# Patient Record
Sex: FEMALE | URBAN - METROPOLITAN AREA
[De-identification: names, ages, dates, MRNs, and addresses within clinical notes are randomized per-mention and may not be internally consistent; named-entity substitution may affect disease eponyms.]

---

## 2019-05-14 ENCOUNTER — OTHER (OUTPATIENT)
Dept: URBAN - METROPOLITAN AREA CLINIC 19 | Facility: CLINIC | Age: 44
Setting detail: DERMATOLOGY
End: 2019-05-14

## 2019-05-14 ENCOUNTER — RX ONLY (RX ONLY)
Age: 44
End: 2019-05-14

## 2019-05-14 PROBLEM — R21 RASH AND OTHER NONSPECIFIC SKIN ERUPTION: Status: RESOLVED | Noted: 2019-05-14

## 2019-05-14 PROCEDURE — 99202 OFFICE O/P NEW SF 15 MIN: CPT

## 2019-05-14 RX ORDER — CLOBETASOL PROPIONATE 0.5 MG/G
1 APPLICATION CREAM TOPICAL AS DIRECTED
Qty: 60 | Refills: 0
Start: 2019-05-14

## 2025-03-10 ENCOUNTER — APPOINTMENT (OUTPATIENT)
Dept: URBAN - METROPOLITAN AREA SURGERY 13 | Age: 50
Setting detail: DERMATOLOGY
End: 2025-03-11

## 2025-03-10 ENCOUNTER — APPOINTMENT (OUTPATIENT)
Dept: URBAN - METROPOLITAN AREA SURGERY 13 | Age: 50
Setting detail: DERMATOLOGY
End: 2025-03-10

## 2025-03-10 DIAGNOSIS — D485 NEOPLASM OF UNCERTAIN BEHAVIOR OF SKIN: ICD-10-CM

## 2025-03-10 DIAGNOSIS — Z41.9 ENCOUNTER FOR PROCEDURE FOR PURPOSES OTHER THAN REMEDYING HEALTH STATE, UNSPECIFIED: ICD-10-CM

## 2025-03-10 PROBLEM — D48.5 NEOPLASM OF UNCERTAIN BEHAVIOR OF SKIN: Status: ACTIVE | Noted: 2025-03-10

## 2025-03-10 PROCEDURE — OTHER DIAMONDGLOW: OTHER

## 2025-03-10 PROCEDURE — OTHER BIOPSY BY SHAVE METHOD: OTHER

## 2025-03-10 PROCEDURE — OTHER MIPS QUALITY: OTHER

## 2025-03-10 PROCEDURE — 11102 TANGNTL BX SKIN SINGLE LES: CPT

## 2025-03-10 ASSESSMENT — LOCATION DETAILED DESCRIPTION DERM
LOCATION DETAILED: RIGHT ULNAR DORSAL HAND
LOCATION DETAILED: LEFT ULNAR DORSAL HAND
LOCATION DETAILED: RIGHT UPPER CUTANEOUS LIP
LOCATION DETAILED: RIGHT MEDIAL FOREHEAD

## 2025-03-10 ASSESSMENT — LOCATION SIMPLE DESCRIPTION DERM
LOCATION SIMPLE: RIGHT HAND
LOCATION SIMPLE: LEFT HAND
LOCATION SIMPLE: RIGHT FOREHEAD
LOCATION SIMPLE: RIGHT LIP

## 2025-03-10 ASSESSMENT — LOCATION ZONE DERM
LOCATION ZONE: HAND
LOCATION ZONE: FACE
LOCATION ZONE: LIP

## 2025-03-10 NOTE — PROCEDURE: DIAMONDGLOW
Post-Care Instructions: I reviewed with the patient in detail post-care instructions. Patient should stay away from the sun and wear sun protection until treated areas are fully healed.
Body Treatment Head Used?: Not Used
Intelliflow Settin%
Solution Used: Even and Correct Advanced Brightening
Endpoint: mild erythema
Comments (Will Appear Before The Postcare): obagi cleanser/epionce mask\\nobagi hydro drops/epionce renewal lotion\\nextractions and massaged hands
Facial Treatment Head Used?: Facial: Fine (120 grit) 6 mm
Consent: written consent obtained
Detail Level: Zone
Vacuum Pressure In Inches: 2

## 2025-03-10 NOTE — PROCEDURE: BIOPSY BY SHAVE METHOD
Detail Level: Detailed
Depth Of Biopsy: dermis
Was A Bandage Applied: Yes
Size Of Lesion In Cm: 0.4
X Size Of Lesion In Cm: 0
Biopsy Type: H and E
Biopsy Method: Dermablade
Anesthesia Type: 1% lidocaine with epinephrine
Anesthesia Volume In Cc: 0.5
Hemostasis: Aluminum Chloride
Wound Care: Vaseline
Dressing: bandage
Destruction After The Procedure: No
Type Of Destruction Used: Curettage
Curettage Text: The wound bed was treated with curettage after the biopsy was performed.
Cryotherapy Text: The wound bed was treated with cryotherapy after the biopsy was performed.
Electrodesiccation Text: The wound bed was treated with electrodesiccation after the biopsy was performed.
Electrodesiccation And Curettage Text: The wound bed was treated with electrodesiccation and curettage after the biopsy was performed.
Silver Nitrate Text: The wound bed was treated with silver nitrate after the biopsy was performed.
Lab: [id number]
Lab Facility: [id number]
Medical Necessity Information: It is in your best interest to select a reason for this procedure from the list below. All of these items fulfill various CMS LCD requirements except the new and changing color options.
Consent: Written consent was obtained and risks were reviewed including but not limited to scarring, infection, bleeding, scabbing, incomplete removal, nerve damage and allergy to anesthesia.
Post-Care Instructions: I reviewed with the patient in detail post-care instructions. Patient is to keep the biopsy site dry overnight, and then apply bacitracin twice daily until healed. Patient may apply hydrogen peroxide soaks to remove any crusting.
Notification Instructions: Patient will be notified of biopsy results. However, patient instructed to call the office if not contacted within 2 weeks.
Billing Type: Third-Party Bill
Information: Selecting Yes will display possible errors in your note based on the variables you have selected. This validation is only offered as a suggestion for you. PLEASE NOTE THAT THE VALIDATION TEXT WILL BE REMOVED WHEN YOU FINALIZE YOUR NOTE. IF YOU WANT TO FAX A PRELIMINARY NOTE YOU WILL NEED TO TOGGLE THIS TO 'NO' IF YOU DO NOT WANT IT IN YOUR FAXED NOTE.

## 2025-03-10 NOTE — HPI: EVALUATION OF SKIN LESION(S)
What Type Of Note Output Would You Prefer (Optional)?: Bullet Format
How Severe Are Your Spot(S)?: mild
Hpi Title: Evaluation of a Skin Lesion
Family Member: Grandmother

## 2025-03-19 ENCOUNTER — RX ONLY (RX ONLY)
Age: 50
End: 2025-03-19

## 2025-03-19 RX ORDER — TRETIONIN 0.5 MG/G
CREAM TOPICAL
Qty: 45 | Refills: 1 | Status: ERX | COMMUNITY
Start: 2025-03-19

## 2025-04-07 ENCOUNTER — APPOINTMENT (OUTPATIENT)
Dept: URBAN - METROPOLITAN AREA SURGERY 13 | Age: 50
Setting detail: DERMATOLOGY
End: 2025-04-07

## 2025-04-07 DIAGNOSIS — I78.8 OTHER DISEASES OF CAPILLARIES: ICD-10-CM

## 2025-04-07 PROCEDURE — OTHER VBEAM PERFECTA LASER: OTHER

## 2025-04-07 NOTE — HPI: PROMINENT VEINS (TELANGIECTASIA)
Is This A New Presentation, Or A Follow-Up?: Prominent Veins
Additional History: small scar on lip line from shave biopsy

## 2025-04-07 NOTE — PROCEDURE: VBEAM PERFECTA LASER
Post-Procedure: Following the procedure the post care instructions were reviewed with the patient.
Is There A Second Setting?: no
Fluence (J/Cm2): 11
Spot Size: 3x10 mm
Pulse Duration: 1.5 ms
Location: upper lip line
Spray Time (Ms): 0
Consent: Written consent obtained.  \\nRisks were reviewed including but not limited to crusting, scabbing, blistering, scarring, darker or lighter pigmentary change, bruising, and/or incomplete response.
Spot Size: 3 mm
Post-Care Instructions: I reviewed with the patient in detail post-care instructions.  Cool compresses or cold gel packs may be applied after treatment.  Exposure to the sun should be avoided and sun protection and sunscreen should be used.
Pulse Duration: 10 ms
Number Of Pulses: 3
Treatment Number: 1
Pre-Procedure: The treatment areas identified by the patient were cleansed and treatment was performed with the parameters mentioned\\n\\nLaser adhesive eye shields and metal laser goggles used during procedure
Detail Level: Zone